# Patient Record
Sex: MALE | Race: WHITE | URBAN - METROPOLITAN AREA
[De-identification: names, ages, dates, MRNs, and addresses within clinical notes are randomized per-mention and may not be internally consistent; named-entity substitution may affect disease eponyms.]

---

## 2018-02-26 ENCOUNTER — TRANSFERRED RECORDS (OUTPATIENT)
Dept: HEALTH INFORMATION MANAGEMENT | Facility: CLINIC | Age: 23
End: 2018-02-26

## 2018-03-03 ENCOUNTER — APPOINTMENT (OUTPATIENT)
Dept: GENERAL RADIOLOGY | Facility: CLINIC | Age: 23
End: 2018-03-03
Attending: EMERGENCY MEDICINE
Payer: COMMERCIAL

## 2018-03-03 ENCOUNTER — HOSPITAL ENCOUNTER (EMERGENCY)
Facility: CLINIC | Age: 23
Discharge: HOME | End: 2018-03-04
Attending: EMERGENCY MEDICINE | Admitting: EMERGENCY MEDICINE
Payer: COMMERCIAL

## 2018-03-03 DIAGNOSIS — R42 DIZZINESS: ICD-10-CM

## 2018-03-03 LAB
ALBUMIN SERPL-MCNC: 4.1 G/DL (ref 3.4–5)
ALP SERPL-CCNC: 60 U/L (ref 40–150)
ALT SERPL W P-5'-P-CCNC: 16 U/L (ref 0–70)
ANION GAP SERPL CALCULATED.3IONS-SCNC: 2 MMOL/L (ref 3–14)
AST SERPL W P-5'-P-CCNC: 12 U/L (ref 0–45)
BASOPHILS # BLD AUTO: 0 10E9/L (ref 0–0.2)
BASOPHILS NFR BLD AUTO: 0.4 %
BILIRUB SERPL-MCNC: 0.3 MG/DL (ref 0.2–1.3)
BUN SERPL-MCNC: 13 MG/DL (ref 7–30)
CALCIUM SERPL-MCNC: 8.4 MG/DL (ref 8.5–10.1)
CHLORIDE SERPL-SCNC: 107 MMOL/L (ref 94–109)
CO2 SERPL-SCNC: 32 MMOL/L (ref 20–32)
CREAT SERPL-MCNC: 0.86 MG/DL (ref 0.66–1.25)
CRP SERPL-MCNC: <2.9 MG/L (ref 0–8)
DIFFERENTIAL METHOD BLD: NORMAL
EOSINOPHIL # BLD AUTO: 0.1 10E9/L (ref 0–0.7)
EOSINOPHIL NFR BLD AUTO: 2.1 %
ERYTHROCYTE [DISTWIDTH] IN BLOOD BY AUTOMATED COUNT: 11.8 % (ref 10–15)
ERYTHROCYTE [SEDIMENTATION RATE] IN BLOOD BY WESTERGREN METHOD: 3 MM/H (ref 0–15)
GFR SERPL CREATININE-BSD FRML MDRD: >90 ML/MIN/1.7M2
GLUCOSE SERPL-MCNC: 90 MG/DL (ref 70–99)
HCT VFR BLD AUTO: 41.7 % (ref 40–53)
HGB BLD-MCNC: 14.4 G/DL (ref 13.3–17.7)
IMM GRANULOCYTES # BLD: 0 10E9/L (ref 0–0.4)
IMM GRANULOCYTES NFR BLD: 0.1 %
INR PPP: 1.07 (ref 0.86–1.14)
LIPASE SERPL-CCNC: 67 U/L (ref 73–393)
LYMPHOCYTES # BLD AUTO: 2.9 10E9/L (ref 0.8–5.3)
LYMPHOCYTES NFR BLD AUTO: 42.2 %
MCH RBC QN AUTO: 30.6 PG (ref 26.5–33)
MCHC RBC AUTO-ENTMCNC: 34.5 G/DL (ref 31.5–36.5)
MCV RBC AUTO: 89 FL (ref 78–100)
MONOCYTES # BLD AUTO: 0.6 10E9/L (ref 0–1.3)
MONOCYTES NFR BLD AUTO: 9.3 %
NEUTROPHILS # BLD AUTO: 3.1 10E9/L (ref 1.6–8.3)
NEUTROPHILS NFR BLD AUTO: 45.9 %
NRBC # BLD AUTO: 0 10*3/UL
NRBC BLD AUTO-RTO: 0 /100
PLATELET # BLD AUTO: 242 10E9/L (ref 150–450)
POTASSIUM SERPL-SCNC: 3.9 MMOL/L (ref 3.4–5.3)
PROT SERPL-MCNC: 7.2 G/DL (ref 6.8–8.8)
RBC # BLD AUTO: 4.71 10E12/L (ref 4.4–5.9)
SODIUM SERPL-SCNC: 141 MMOL/L (ref 133–144)
TROPONIN I SERPL-MCNC: <0.015 UG/L (ref 0–0.04)
WBC # BLD AUTO: 6.8 10E9/L (ref 4–11)

## 2018-03-03 PROCEDURE — 85652 RBC SED RATE AUTOMATED: CPT | Performed by: EMERGENCY MEDICINE

## 2018-03-03 PROCEDURE — 25000132 ZZH RX MED GY IP 250 OP 250 PS 637: Performed by: EMERGENCY MEDICINE

## 2018-03-03 PROCEDURE — 93005 ELECTROCARDIOGRAM TRACING: CPT | Performed by: EMERGENCY MEDICINE

## 2018-03-03 PROCEDURE — 99285 EMERGENCY DEPT VISIT HI MDM: CPT | Mod: 25 | Performed by: EMERGENCY MEDICINE

## 2018-03-03 PROCEDURE — 85025 COMPLETE CBC W/AUTO DIFF WBC: CPT | Performed by: EMERGENCY MEDICINE

## 2018-03-03 PROCEDURE — 86140 C-REACTIVE PROTEIN: CPT | Performed by: EMERGENCY MEDICINE

## 2018-03-03 PROCEDURE — 84484 ASSAY OF TROPONIN QUANT: CPT | Performed by: EMERGENCY MEDICINE

## 2018-03-03 PROCEDURE — 71046 X-RAY EXAM CHEST 2 VIEWS: CPT

## 2018-03-03 PROCEDURE — 85610 PROTHROMBIN TIME: CPT | Performed by: EMERGENCY MEDICINE

## 2018-03-03 PROCEDURE — 96360 HYDRATION IV INFUSION INIT: CPT | Performed by: EMERGENCY MEDICINE

## 2018-03-03 PROCEDURE — 83690 ASSAY OF LIPASE: CPT | Performed by: EMERGENCY MEDICINE

## 2018-03-03 PROCEDURE — 80053 COMPREHEN METABOLIC PANEL: CPT | Performed by: EMERGENCY MEDICINE

## 2018-03-03 PROCEDURE — 93010 ELECTROCARDIOGRAM REPORT: CPT | Mod: Z6 | Performed by: EMERGENCY MEDICINE

## 2018-03-03 RX ORDER — HYDROXYZINE HYDROCHLORIDE 25 MG/1
25 TABLET, FILM COATED ORAL ONCE
Status: COMPLETED | OUTPATIENT
Start: 2018-03-03 | End: 2018-03-03

## 2018-03-03 RX ADMIN — HYDROXYZINE HYDROCHLORIDE 25 MG: 25 TABLET ORAL at 22:46

## 2018-03-03 ASSESSMENT — ENCOUNTER SYMPTOMS
NAUSEA: 1
CHILLS: 0
VOMITING: 0
WEAKNESS: 0
LIGHT-HEADEDNESS: 1
POLYDIPSIA: 0
NUMBNESS: 0
DYSURIA: 0
NERVOUS/ANXIOUS: 0
DIAPHORESIS: 0
PALPITATIONS: 1
BRUISES/BLEEDS EASILY: 0
FREQUENCY: 0
COUGH: 0
CONSTIPATION: 0
FEVER: 0
EYE PAIN: 0
COLOR CHANGE: 0
ADENOPATHY: 0
DIZZINESS: 0
ABDOMINAL PAIN: 1
DYSPHORIC MOOD: 0
BLOOD IN STOOL: 0
VOICE CHANGE: 0
BACK PAIN: 0
TROUBLE SWALLOWING: 0
SORE THROAT: 0
SHORTNESS OF BREATH: 0
HEMATURIA: 0
NECK PAIN: 0
HEADACHES: 0
CHEST TIGHTNESS: 1
DIARRHEA: 0

## 2018-03-03 NOTE — ED AVS SNAPSHOT
[facility], [place], Emergency Department    [address]    [facility] [address]    Phone:  [phone]    Fax:  [phone]                                       Vinny Jacobsen   MRN: [id number]    Department:  [facility], Emergency Department   Date of Visit:  3/3/2018           After Visit Summary Signature Page     I have received my discharge instructions, and my questions have been answered. I have discussed any challenges I see with this plan with the nurse or doctor.    ..........................................................................................................................................  Patient/Patient Representative Signature      ..........................................................................................................................................  Patient Representative Print Name and Relationship to Patient    ..................................................               ................................................  Date                                            Time    ..........................................................................................................................................  Reviewed by Signature/Title    ...................................................              ..............................................  Date                                                            Time

## 2018-03-03 NOTE — ED AVS SNAPSHOT
[facility], Emergency Department    [address]    [place] [address]    Phone:  [phone]    Fax:  [phone]                                       Vinny Jacobsen   MRN: [id number]    Department:  [facility], Emergency Department   Date of Visit:  3/3/2018           Patient Information     Date Of Birth          1995        Your diagnoses for this visit were:     Dizziness        You were seen by Olivia Helton MD and Thomas Aguilar MD.        Discharge Instructions       FOLLOW-UP:  Please make an appointment to follow up with:  - Your Primary Care Provider as soon as possible.   --- As soon as you get home to [place], please call first thing Monday morning to arrange a follow-up appointment with a Primary Care provider. If you do not have a provider, we encourage you to call a local clinic to establish care so that you can be seen in follow-up from today's visit to ensure you are continuing to improve and see if you need continued evaluation/treatment for your symptoms.    OTHER INSTRUCTIONS:  - Do your best to stay hydrated.    RETURN TO THE EMERGENCY DEPARTMENT  Return to the Emergency Department at any time for new/worsening symptoms.       Dizziness (Uncertain Cause)  Dizziness is a common symptom. It may be described as lightheadedness, spinning, or feeling like you are going to faint. Dizziness can have many causes.  Be sure to tell the healthcare provider about:    All medicines you take, including prescription, over-the-counter, herbs, and supplements    Any other symptoms you have    Any health problems you are being treated for    Anything that causes the dizziness to get worse or better  Today's exam did not show an exact cause for your dizziness. Other tests may be needed. Follow up with your healthcare provider.  Home care    Dizziness that occurs with sudden standing may be a sign of mild dehydration. Drink extra fluids for the next few days.    If you recently  started a new medicine, stopped a medicine, or had the dose of a current medicine changed, talk with the prescribing healthcare provider. Your medicine plan may need adjustment.    If dizziness lasts more than a few seconds, sit or lie down until it passes. This may help prevent injury in case you pass out.    Do not drive or use power tools or dangerous equipment until you have had no dizziness for at least 48 hours.  Follow-up care  Follow up with your healthcare provider for further evaluation within the next 7 days or as advised.  When to seek medical advice  Call your healthcare provider for any of the following:    Worsening of symptoms or new symptoms    Passing out or seizure    Repeated vomiting    Headache    Palpitations (the sense that your heart is fluttering or beating fast or hard)    Shortness of breath    Blood in vomit or stool (black or red color)    Weakness of an arm or leg or one side of the face    Vision or hearing changes    Trouble walking or speaking    Chest, arm, neck, back, or jaw pain  Date Last Reviewed: 8/23/2015 2000-2017 [employer]. [address] [place] [address]. All rights reserved. This information is not intended as a substitute for professional medical care. Always follow your healthcare professional's instructions.            24 Hour Appointment Hotline       To make an appointment at any [facility], call [phone] ([phone]). If you don't have a family doctor or clinic, we will help you find one. [facility] are conveniently located to serve the needs of you and your family.             Review of your medicines      Notice     You have not been prescribed any medications.            Procedures and tests performed during your visit     CBC with platelets differential    CRP inflammation    CT Head Neck Angio w/o & w Contrast    Comprehensive metabolic panel    EKG 12-lead, tracing only    Erythrocyte sedimentation rate auto     "Head CT w/o contrast    INR    Lipase    Troponin I    XR Chest 2 Views      Orders Needing Specimen Collection     None      Pending Results     Date and Time Order Name Status Description    3/4/2018 0102 Head CT w/o contrast In process     3/4/2018 0053 CT Head Neck Angio w/o & w Contrast In process     3/3/2018 2241 XR Chest 2 Views Preliminary             Pending Culture Results     No orders found for last 3 day(s).            Pending Results Instructions     If you had any lab results that were not finalized at the time of your Discharge, you can call the ED Lab Result RN at [phone]. You will be contacted by this team for any positive Lab results or changes in treatment. The nurses are available 7 days a week from 10A to 6:30P.  You can leave a message 24 hours per day and they will return your call.        Thank you for choosing [facility]       Thank you for choosing [facility] for your care. Our goal is always to provide you with excellent care. Hearing back from our patients is one way we can continue to improve our services. Please take a few minutes to complete the written survey that you may receive in the mail after you visit with us. Thank you!        [employer] Information     [employer] lets you send messages to your doctor, view your test results, renew your prescriptions, schedule appointments and more. To sign up, go to [email or web address] . Click on \"Log in\" on the left side of the screen, which will take you to the Welcome page. Then click on \"Sign up Now\" on the right side of the page.     You will be asked to enter the access code listed below, as well as some personal information. Please follow the directions to create your username and password.     Your access code is: [email or web address]  Expires: 2018  2:56 AM     Your access code will  in 90 days. If you need help or a new code, please call your [facility] clinic or [phone].        Care EveryWhere ID     This is your Care " EveryWhere ID. This could be used by other organizations to access your [facility] medical records  [id number]        Equal Access to Services     RIOS MEJIA : Philip Mcbride, katelynn cantu, annabella javier, antione du. So [facility] [phone].    ATENCIÓN: Si habla español, tiene a marx disposición servicios gratuitos de asistencia lingüística. Llame al [phone].    We comply with applicable federal civil rights laws and [place] laws. We do not discriminate on the basis of race, color, national origin, age, disability, sex, sexual orientation, or gender identity.            After Visit Summary       This is your record. Keep this with you and show to your community pharmacist(s) and doctor(s) at your next visit.

## 2018-03-04 ENCOUNTER — APPOINTMENT (OUTPATIENT)
Dept: CT IMAGING | Facility: CLINIC | Age: 23
End: 2018-03-04
Attending: EMERGENCY MEDICINE
Payer: COMMERCIAL

## 2018-03-04 VITALS
RESPIRATION RATE: 18 BRPM | OXYGEN SATURATION: 97 % | WEIGHT: 140 LBS | DIASTOLIC BLOOD PRESSURE: 64 MMHG | TEMPERATURE: 96.8 F | SYSTOLIC BLOOD PRESSURE: 112 MMHG | HEART RATE: 50 BPM

## 2018-03-04 LAB — INTERPRETATION ECG - MUSE: NORMAL

## 2018-03-04 PROCEDURE — 70450 CT HEAD/BRAIN W/O DYE: CPT | Mod: XS

## 2018-03-04 PROCEDURE — 25000128 H RX IP 250 OP 636

## 2018-03-04 PROCEDURE — 25000128 H RX IP 250 OP 636: Performed by: EMERGENCY MEDICINE

## 2018-03-04 PROCEDURE — 70498 CT ANGIOGRAPHY NECK: CPT

## 2018-03-04 RX ORDER — SODIUM CHLORIDE 9 MG/ML
INJECTION, SOLUTION INTRAVENOUS
Status: COMPLETED
Start: 2018-03-04 | End: 2018-03-04

## 2018-03-04 RX ORDER — IOPAMIDOL 755 MG/ML
100 INJECTION, SOLUTION INTRAVASCULAR ONCE
Status: COMPLETED | OUTPATIENT
Start: 2018-03-04 | End: 2018-03-04

## 2018-03-04 RX ADMIN — SODIUM CHLORIDE 1000 ML: 9 INJECTION, SOLUTION INTRAVENOUS at 01:50

## 2018-03-04 RX ADMIN — IOPAMIDOL 70 ML: 755 INJECTION, SOLUTION INTRAVENOUS at 01:39

## 2018-03-04 NOTE — DISCHARGE INSTRUCTIONS
FOLLOW-UP:  Please make an appointment to follow up with:  - Your Primary Care Provider as soon as possible.   --- As soon as you get home to [place], please call first thing Monday morning to arrange a follow-up appointment with a Primary Care provider. If you do not have a provider, we encourage you to call a local clinic to establish care so that you can be seen in follow-up from today's visit to ensure you are continuing to improve and see if you need continued evaluation/treatment for your symptoms.    OTHER INSTRUCTIONS:  - Do your best to stay hydrated.    RETURN TO THE EMERGENCY DEPARTMENT  Return to the Emergency Department at any time for new/worsening symptoms.       Dizziness (Uncertain Cause)  Dizziness is a common symptom. It may be described as lightheadedness, spinning, or feeling like you are going to faint. Dizziness can have many causes.  Be sure to tell the healthcare provider about:    All medicines you take, including prescription, over-the-counter, herbs, and supplements    Any other symptoms you have    Any health problems you are being treated for    Anything that causes the dizziness to get worse or better  Today's exam did not show an exact cause for your dizziness. Other tests may be needed. Follow up with your healthcare provider.  Home care    Dizziness that occurs with sudden standing may be a sign of mild dehydration. Drink extra fluids for the next few days.    If you recently started a new medicine, stopped a medicine, or had the dose of a current medicine changed, talk with the prescribing healthcare provider. Your medicine plan may need adjustment.    If dizziness lasts more than a few seconds, sit or lie down until it passes. This may help prevent injury in case you pass out.    Do not drive or use power tools or dangerous equipment until you have had no dizziness for at least 48 hours.  Follow-up care  Follow up with your healthcare provider for further evaluation within the next  7 days or as advised.  When to seek medical advice  Call your healthcare provider for any of the following:    Worsening of symptoms or new symptoms    Passing out or seizure    Repeated vomiting    Headache    Palpitations (the sense that your heart is fluttering or beating fast or hard)    Shortness of breath    Blood in vomit or stool (black or red color)    Weakness of an arm or leg or one side of the face    Vision or hearing changes    Trouble walking or speaking    Chest, arm, neck, back, or jaw pain  Date Last Reviewed: 8/23/2015 2000-2017 [employer]. [address] [place] [address]. All rights reserved. This information is not intended as a substitute for professional medical care. Always follow your healthcare professional's instructions.

## 2018-03-04 NOTE — ED NOTES
Pt notes he is feeling much better and would like to go home. Orthostatics done. Pt denies dizziness, cp, or SOB.

## 2018-03-04 NOTE — ED PROVIDER NOTES
"  History     Chief Complaint   Patient presents with     Dizziness     onset in last 1-2 hours of lightheadedness, nausea with dry heaves, some cheast presure, pt states vmuch anxiety in recent weeks.     Nausea     HPI  Vinny Jacobsen is an otherwise healthy 22 year old male who presents to the ED for evaluation of lightheadedness, nausea, abdominal pain, and left-sided chest tightness. The patient reports that he is currently \"detoxing\" from stressful conditions back home in [place], [place], and has been here in the [facility] visiting friends and staying with his partner who is a considerable calming force for him. He has been experiencing the aforementioned symptoms now intermittently over the past week, but tonight he had the acute onset of them and elected to present to the ED.     He was worked up for this at an outside facility back in [place] last week on 2/26/18. The patient presented to the ED stating that he was at work feeling well prior to the onset of his symptoms, but after finishing work he developed what his companions described as a \"panic attack.\" Specifically, he was complaining of chest pain, abdominal pain, nausea without vomiting, feeling shaky and lightheaded with the sensation of difficulty breathing and \"throat closing.\" His workup included EKG showing sinus bradycardia at 40 BPM with no signs of ischemia, hemoglobin at 14.5, and normal Na, K, creatinine, glucose, and hepatobiliary panel. He was discharged from there with supportive care and return precautions.      He denies any alcohol or drug use (did smoke marijuana heavily at one point, hasn't for a while). He has never been followed by a psychiatrist or therapist for anxiety and does not take anxiolytic medications. He reports having a similar episode to today's when he was [age], but he states he \"was given a shot\" and told to \"go home and relax.\"     He denies any fevers, chills, falls, or syncope, though he does endorse palpitations and " has felt generally weak over the past 7 days. No cough or vomiting, no dysuria or hematuria, and no rashes or skin changes. He states that he is not constipated, but his bowel movements have not been as regular as they used to be. He reports his abdominal pain to originate in his RLQ and then radiate out diffusely, never in a consistent position, and tends to move around w/o particular pattern. He denies any unusual foods, but he reports that his diet today has consisted of sugary, fatty foods. The patient presents with his partner who he is staying with, and his partner denies any of the same symptoms. No other symptoms or complaints at this time. Please see ROS for further details.      PAST MEDICAL HISTORY: Self reports hx of anxiety  PAST SURGICAL HISTORY: No past surgical history on file. Patient denies any pertinent surgical hx  FAMILY HISTORY: No family history on file.  SOCIAL HISTORY:   Social History   Substance Use Topics     Smoking status: Not on file     Smokeless tobacco: Not on file     Alcohol use Not on file   Lives in [place], visiting for the weekend. Denies any substance use.     Patient's Medications    No medications on file        Allergies not on file        I have reviewed the Medications, Allergies, Past Medical and Surgical History, and Social History in the Epic system.    Review of Systems   Constitutional: Negative for chills, diaphoresis and fever.   HENT: Negative for ear pain, sore throat, tinnitus, trouble swallowing and voice change.    Eyes: Negative for pain and visual disturbance.   Respiratory: Positive for chest tightness. Negative for cough and shortness of breath.    Cardiovascular: Positive for palpitations. Negative for chest pain and leg swelling.   Gastrointestinal: Positive for abdominal pain and nausea. Negative for blood in stool, constipation, diarrhea and vomiting.   Endocrine: Negative for polydipsia and polyuria.   Genitourinary: Negative for dysuria, frequency,  hematuria and urgency.   Musculoskeletal: Negative for back pain and neck pain.   Skin: Negative for color change and rash.   Allergic/Immunologic: Negative for immunocompromised state.   Neurological: Positive for light-headedness. Negative for dizziness, syncope, weakness, numbness and headaches.   Hematological: Negative for adenopathy. Does not bruise/bleed easily.   Psychiatric/Behavioral: Negative for dysphoric mood. The patient is not nervous/anxious.        Physical Exam   BP: 107/50  Pulse: 62  Temp: 96.8  F (36  C)  Resp: 14  Weight: 63.5 kg (140 lb)  SpO2: 96 %      Physical Exam  CONSTITUTIONAL: Well-developed and well-nourished. Awake and alert. Non-toxic appearance. No acute distress.   HENT:   - Head: Normocephalic and atraumatic.   - Ears: Hearing and external ear grossly normal.   - Nose: Nose normal. No rhinorrhea. No epistaxis.   - Mouth/Throat: MMM  EYES: Conjunctivae and lids are normal. No scleral icterus.   NECK: Normal range of motion and phonation normal. Neck supple.  No tracheal deviation, no stridor. No edema or erythema noted.  CARDIOVASCULAR: Normal rate, regular rhythm and no appreciable abnormal heart sounds.  PULMONARY/CHEST: Normal work of breathing. No accessory muscle usage or stridor. No respiratory distress.  No appreciable abnormal breath sounds.  ABDOMEN: Soft, non-distended. No tenderness to palpation (said prior pain moved around, no pain currently w/ palpation). No rigidity, rebound or guarding.   MUSCULOSKELETAL: Extremities warm and seemingly well perfused. No edema or calf tenderness.  NEUROLOGIC: Awake, alert. Not disoriented. Pt displays no atrophy and no tremor.  Normal tone. No seizure activity. GCS 15. No apparent focal deficits.  SKIN: Skin is warm and dry. No rash noted. No diaphoresis. No pallor.   PSYCHIATRIC: Full range of affect. Reports feeling anxious. Denies SI or HI.  Thought processes linear. Cognition and memory are normal.     ED Course     ED Course      Procedures             EKG Interpretation:      Interpreted by Olivia Helton MD  Time reviewed: 22:33  Symptoms at time of EKG: Chest tightness   Rhythm: normal sinus   Rate: 46  Axis: Normal  Ectopy: none  Conduction: normal  ST Segments/ T Waves: No ST-T wave changes  Comparison to prior: No old EKG available  Clinical Impression: no acute changes           Labs Ordered and Resulted from Time of ED Arrival Up to the Time of Departure from the ED - No data to display         Assessments & Plan (with Medical Decision Making)   IMPRESSION: 22-year-old male, generally healthy though with a history of some anxiety in the past, though has never been previously treated for such with any psychiatrist/psychologist, presenting today with multiple physical symptoms as further described above in the HPI/ROS.  Clinically, patient appears nontoxic, NAD outside of sinus bradycardia likely normal for his age and healthy body habitus.  His vitals are WNL, no abnormal cardiopulmonary findings on exam.  Abdominal exam is benign without pain or peritoneal findings, no apparent neuro deficits on exam.    DDX includes but not limited to symptoms related to anxiety, occult infectious illness, etc.  I think the likelihood of this being PE to be very low (PERC criteria negative) And I do not think that we need to evaluate further for this based on current clinical presentation.  Considered ACS, pericarditis, etc. and I think that these are also of low probability (will evaluate with a baseline troponin and EKG).  Given his symptoms of lightheadedness, dizziness, considered intracranial process but he considers to be of a relatively low likelihood.  He did report that he has some worsening symptoms with head movements, but no true vertigo.  I think the likelihood of this being a posterior circulation stroke to be a very low likelihood given his young age.  I did consider a potential vascular component such as a dissection, and so we  "will get a baseline head CT with CTA of the head and neck to evaluate further.  If all of this workup is negative, I think he will likely be able to be discharged home with outpatient follow-up back home in [place] where I think he should see a PCP as well as potentially a Psychiatrist for evaluation of possible anxiety type symptoms at the patient as well endorses.  Thankfully no SI or HI at this time.  Does not appear to be having any hallucinations, etc.      PLAN: Laboratory studies, chest x-ray, ECG, head CT/neck CT with and without contrast.    RESULTS:  See ED Course section above for particular pertinent findings and comments  - Labs: CBC WNL, CMP unremarkable.  Troponin negative.  CRP and ESR negative.  - Imaging: Images and written preliminary reports reviewed by myself and revealed:  --- CXR: No acute abnormality.  --- Head/neck CT: Pending at shift change    INTERVENTIONS:   - PO Atarax    RE-EVALUATION:  See ED Course section above for particular pertinent findings and comments  - The patient's symptoms were generally improved here in the ED. He did feel a bit \"woozy\"/tired after the PO Hydroxyzine, but returned to baseline   - Pt continues to do well here in the ED, no acute issues or apparent concerning changes in vitals or clinical appearance.     DISCUSSIONS:  - w/ Patient: I have reviewed the available findings, tentative plan at shift change with the patient and his loved one/guest. Should patient be able to be discharged, reviewed in the importance of close F/U and strict return instructions. They expressed understanding and agreement with this plan. All questions answered to the best of our ability at this time.     SIGNOUT:  - Patient signed out to overnight EM MD.  - Impression at shift change: Multiple symptoms as above  - Pending at shift change: CT Head/neck w/ & w/o  - Tentative plan: F/U Imaging. Manage positive findings accordingly, D/C to home w/ close PCP F/U and strict return " instructions if negative.         New Prescriptions    No medications on file       Final diagnoses:   None   IKareem, am serving as a trained medical scribe to document services personally performed by Olivia Helton MD, based on the provider's statements to me.      IOlivia MD, was physically present and have reviewed and verified the accuracy of this note documented by Kareem Villagran.      3/3/2018   [facility], [place], EMERGENCY DEPARTMENT     Olivia Helton MD  03/06/18 0655

## 2018-03-04 NOTE — ED NOTES
SIGN-OUT:  - Assumed care of this patient from Dr. Helton  - Pending at shift change: CT head and neck  - Tentative plan:  Follow up on CT and CTA head and neck. If negative will DC with PCP follow up.       REASSESSMENT:  - No acute issues or interventions necessary while still in the emergency department.  CT scan normal.  CTA normal.  Upon reevaluation patient reports that he was completely asymptomatic anxious to go home had been ambulating without any symptoms.  Patient will be discharged with discharge instructions as seen in Dr. Helton's note.    DISPOSITION:  - Patient carried on with their original disposition plan.           Thomas Aguilar MD  03/04/18 5512